# Patient Record
Sex: FEMALE | Race: WHITE | HISPANIC OR LATINO
[De-identification: names, ages, dates, MRNs, and addresses within clinical notes are randomized per-mention and may not be internally consistent; named-entity substitution may affect disease eponyms.]

---

## 2024-09-19 ENCOUNTER — DASHBOARD ENCOUNTERS (OUTPATIENT)
Age: 54
End: 2024-09-19

## 2024-09-19 ENCOUNTER — OFFICE VISIT (OUTPATIENT)
Dept: URBAN - METROPOLITAN AREA CLINIC 54 | Facility: CLINIC | Age: 54
End: 2024-09-19
Payer: COMMERCIAL

## 2024-09-19 VITALS
TEMPERATURE: 97.5 F | WEIGHT: 166.6 LBS | SYSTOLIC BLOOD PRESSURE: 154 MMHG | BODY MASS INDEX: 31.45 KG/M2 | HEART RATE: 83 BPM | DIASTOLIC BLOOD PRESSURE: 96 MMHG | HEIGHT: 61 IN

## 2024-09-19 DIAGNOSIS — K14.6 TONGUE PAIN: ICD-10-CM

## 2024-09-19 DIAGNOSIS — Z86.19 HISTORY OF HELICOBACTER PYLORI INFECTION: ICD-10-CM

## 2024-09-19 PROBLEM — 30731004: Status: ACTIVE | Noted: 2024-09-19

## 2024-09-19 PROCEDURE — 99243 OFF/OP CNSLTJ NEW/EST LOW 30: CPT

## 2024-09-19 PROCEDURE — 99203 OFFICE O/P NEW LOW 30 MIN: CPT

## 2024-09-19 RX ORDER — ATORVASTATIN CALCIUM 20 MG/1
1 TABLET TABLET, FILM COATED ORAL ONCE A DAY
Status: ACTIVE | COMMUNITY

## 2024-09-19 NOTE — HPI-TODAY'S VISIT:
9/19/24: Patient is a healthy 55 yo was referred by EBENEZER Arechiga for RUQ pain and positive H pylori test. A copy of this document will be sent to the provider. Pt report 1-2 months of tongue/mouth pain that feels "like when you eat a pineapple and it burns your mouth." PCP checked H pylori breat test which was positive on 8/9. Treated with metronidazole, clarithromycin, and omeprazole. Repeat H pylori stool test was negative on 9/12. However pt states symptoms have not improved, referring to her mouth pain. Denies any epigastric or RUQ pain, no nausea/vomiting, loss of appetite, early satiety, heartburn, or reflux. No recent Covid infection. Negative Cologuard this year. Normal RUQ US, s/p CCY.

## 2024-09-19 NOTE — PHYSICAL EXAM HENT:
Head, normocephalic, atraumatic, Face, Face within normal limits, Ears, External ears within normal limits, , Oral cavity and tongue appear normal, no evidence of thrush

## 2024-10-17 ENCOUNTER — LAB OUTSIDE AN ENCOUNTER (OUTPATIENT)
Dept: URBAN - METROPOLITAN AREA CLINIC 54 | Facility: CLINIC | Age: 54
End: 2024-10-17

## 2025-07-18 ENCOUNTER — OFFICE VISIT (OUTPATIENT)
Dept: URBAN - METROPOLITAN AREA CLINIC 54 | Facility: CLINIC | Age: 55
End: 2025-07-18

## 2025-07-28 ENCOUNTER — OFFICE VISIT (OUTPATIENT)
Dept: URBAN - METROPOLITAN AREA CLINIC 54 | Facility: CLINIC | Age: 55
End: 2025-07-28
Payer: COMMERCIAL

## 2025-07-28 DIAGNOSIS — K59.00 CONSTIPATION, UNSPECIFIED CONSTIPATION TYPE: ICD-10-CM

## 2025-07-28 DIAGNOSIS — K14.6 TONGUE PAIN: ICD-10-CM

## 2025-07-28 DIAGNOSIS — Z86.19 HISTORY OF HELICOBACTER PYLORI INFECTION: ICD-10-CM

## 2025-07-28 PROCEDURE — 99213 OFFICE O/P EST LOW 20 MIN: CPT | Performed by: PHYSICIAN ASSISTANT

## 2025-07-28 RX ORDER — CEPHALEXIN 500 MG/1
1 CAPSULE CAPSULE ORAL
Status: ACTIVE | COMMUNITY

## 2025-07-28 NOTE — HPI-TODAY'S VISIT:
9/19/24: Patient is a healthy 55 yo was referred by EBENEZER Arechiga for RUQ pain and positive H pylori test. A copy of this document will be sent to the provider. Pt report 1-2 months of tongue/mouth pain that feels "like when you eat a pineapple and it burns your mouth." PCP checked H pylori breat test which was positive on 8/9. Treated with metronidazole, clarithromycin, and omeprazole. Repeat H pylori stool test was negative on 9/12. However pt states symptoms have not improved, referring to her mouth pain. Denies any epigastric or RUQ pain, no nausea/vomiting, loss of appetite, early satiety, heartburn, or reflux. No recent Covid infection. Negative Cologuard this year. Normal RUQ US, s/p CCY.  7/28/25: Patient presents for routine follow up for burning mouth. Patient notes changing diet to less sugar over the last 2 weeks with improvement. No xerostomia. Has not seen a dentist. No ongoing abdominal pain. Patient does note occasional constipation. Good water intake + vegetables.